# Patient Record
(demographics unavailable — no encounter records)

---

## 2024-10-18 NOTE — PHYSICAL EXAM
[Alert] : alert [No Acute Distress] : no acute distress [Sclera] : the sclera and conjunctiva were normal [Hearing Threshold Finger Rub Not [place]] : hearing was normal [Normal Appearance] : the appearance of the neck was normal [No Respiratory Distress] : no respiratory distress [Auscultation Breath Sounds / Voice Sounds] : lungs were clear to auscultation bilaterally [Normal S1, S2] : normal S1 and S2 [Heart Rate And Rhythm] : heart rate was normal and rhythm regular [None] : no edema [Bowel Sounds] : normal bowel sounds [Abdomen Tenderness] : non-tender [No Masses] : no abdominal mass palpated [Abdomen Soft] : soft [Supraclavicular Lymph Nodes Enlarged Bilaterally] : no supraclavicular lymphadenopathy [Cervical Lymph Nodes Enlarged Anterior Bilaterally] : no anterior cervical lymphadenopathy [No CVA Tenderness] : no CVA  tenderness [Abnormal Walk] : normal gait [Normal Color / Pigmentation] : normal skin color and pigmentation [Normal Turgor] : normal skin turgor [No Focal Deficits] : no focal deficits [Oriented To Time, Place, And Person] : oriented to person, place, and time

## 2024-10-18 NOTE — ASSESSMENT
[FreeTextEntry1] : 1.  Epigastric pain, reflux.  May be due to increased GERD due to delayed gastric emptying from Mounjaro or recurrent ulcers.  EGD in 6/2021 with irregular Z-line, reflux, hiatus hernia, and superficial gastric ulcers. 2.  Iron deficiency anemia.  Rule out GI bleeding, malabsorption.  Colonoscopy in 6/2021 with hemorrhoids; colonoscopy by Dr. Johnson in 2015 with anal fissure. 3.  Irritable bowel syndrome, constipation variant with history of anal fissure. 4.  Obesity on Mounjaro. 5.  HTN. 6.  Hypothyroidism. 7.  Type 2 DM.  Plan: - Recent labs reviewed. - Prior EGD and colonoscopy report reviewed. - GERD lifestyle modifications discussed. - Long term risks of PPIs discussed.  Can alternate PPI with famotidine to minimize dose effects. - Consider iron supplement with Vitamin C. - Patient was advised to undergo endoscopy and colonoscopy- procedure, risks, benefits, and alternatives were explained. Patient agreeable. Brochure given. Miralax prep.  If no obvious source for anemia is found, will consider capsule endoscopy.

## 2024-10-18 NOTE — REASON FOR VISIT
[Follow-up] : a follow-up of an existing diagnosis [FreeTextEntry1] : Epigastric pain, iron deficiency

## 2024-10-18 NOTE — PHYSICAL EXAM
[Alert] : alert [No Acute Distress] : no acute distress [Hearing Threshold Finger Rub Not [place]] : hearing was normal [Sclera] : the sclera and conjunctiva were normal [Normal Appearance] : the appearance of the neck was normal [No Respiratory Distress] : no respiratory distress [Auscultation Breath Sounds / Voice Sounds] : lungs were clear to auscultation bilaterally [Heart Rate And Rhythm] : heart rate was normal and rhythm regular [Normal S1, S2] : normal S1 and S2 [None] : no edema [Bowel Sounds] : normal bowel sounds [No Masses] : no abdominal mass palpated [Abdomen Tenderness] : non-tender [Abdomen Soft] : soft [Supraclavicular Lymph Nodes Enlarged Bilaterally] : no supraclavicular lymphadenopathy [Cervical Lymph Nodes Enlarged Anterior Bilaterally] : no anterior cervical lymphadenopathy [No CVA Tenderness] : no CVA  tenderness [Abnormal Walk] : normal gait [Normal Color / Pigmentation] : normal skin color and pigmentation [Normal Turgor] : normal skin turgor [No Focal Deficits] : no focal deficits [Oriented To Time, Place, And Person] : oriented to person, place, and time

## 2024-11-05 NOTE — ASSESSMENT
[FreeTextEntry1] : annual exam  DM- stable on monjaro  BP- well controlled  no acute complaints-  going for a colonoscopy and endoscopy later this month UTD with mammo  Blood work drawn in office today  ekg - reviewed

## 2024-11-05 NOTE — HEALTH RISK ASSESSMENT
[Good] : ~his/her~  mood as  good [Yes] : Yes [Monthly or less (1 pt)] : Monthly or less (1 point) [1 or 2 (0 pts)] : 1 or 2 (0 points) [No] : In the past 12 months have you used drugs other than those required for medical reasons? No [No falls in past year] : Patient reported no falls in the past year [0] : 2) Feeling down, depressed, or hopeless: Not at all (0) [Never] : Never [With Significant Other] : lives with significant other [] :  [Fully functional (bathing, dressing, toileting, transferring, walking, feeding)] : Fully functional (bathing, dressing, toileting, transferring, walking, feeding) [Fully functional (using the telephone, shopping, preparing meals, housekeeping, doing laundry, using] : Fully functional and needs no help or supervision to perform IADLs (using the telephone, shopping, preparing meals, housekeeping, doing laundry, using transportation, managing medications and managing finances) [Smoke Detector] : smoke detector [Carbon Monoxide Detector] : carbon monoxide detector [Sunscreen] : uses sunscreen [PHQ-2 Negative - No further assessment needed] : PHQ-2 Negative - No further assessment needed [Patient reported mammogram was normal] : Patient reported mammogram was normal [Patient reported colonoscopy was normal] : Patient reported colonoscopy was normal [de-identified] : GI follow up  [de-identified] : gym [de-identified] : fairly healthy  [[id number]] : 0 [Reports changes in hearing] : Reports no changes in hearing [Reports changes in vision] : Reports no changes in vision [MammogramDate] : 2/2024 [ColonoscopyDate] : 6/2021

## 2024-11-05 NOTE — HISTORY OF PRESENT ILLNESS
[FreeTextEntry1] : annual exam [de-identified] : annual exam  DM- stable on monjaro  BP- well controlled  no acute complaints-  going for a colonoscopy and endoscopy later this month